# Patient Record
Sex: MALE | Race: WHITE
[De-identification: names, ages, dates, MRNs, and addresses within clinical notes are randomized per-mention and may not be internally consistent; named-entity substitution may affect disease eponyms.]

---

## 2018-06-01 ENCOUNTER — HOSPITAL ENCOUNTER (EMERGENCY)
Dept: HOSPITAL 50 - VM.ED | Age: 63
Discharge: HOME | End: 2018-06-01
Payer: COMMERCIAL

## 2018-06-01 DIAGNOSIS — Z79.899: ICD-10-CM

## 2018-06-01 DIAGNOSIS — Z88.8: ICD-10-CM

## 2018-06-01 DIAGNOSIS — I10: ICD-10-CM

## 2018-06-01 DIAGNOSIS — Z79.82: ICD-10-CM

## 2018-06-01 DIAGNOSIS — T15.91XA: Primary | ICD-10-CM

## 2018-06-01 DIAGNOSIS — E03.9: ICD-10-CM

## 2018-06-01 DIAGNOSIS — E78.00: ICD-10-CM

## 2018-06-04 NOTE — EDM.PDOC
ED HPI GENERAL MEDICAL PROBLEM





- General


Chief Complaint: ENT Problem


Stated Complaint: ?Foreign body in right eye


Time Seen by Provider: 06/01/18 20:00


Source of Information: Reports: Patient


History Limitations: Reports: No Limitations





- History of Present Illness


INITIAL COMMENTS - FREE TEXT/NARRATIVE: 





Pt. was handing cabinets in his kitchen and felt he got something in his eye 2 

days before coming to ER. He lives in [place], [place] and is here for a [employer] 

convention. He states that throughout the day today, he has noticed watering 

and discomfort to the R eye. Denies any significant occular trauma.


Location: Reports: Face


Treatments PTA: Reports: Other (see below)


Other Treatments PTA: flushe eye with NS eye drops


  ** Right eye


Pain Score (Numeric/FACES): 4





- Related Data


 Allergies











Allergy/AdvReac Type Severity Reaction Status Date / Time


 


niacin Allergy  Other Verified 06/01/18 19:50











Home Meds: 


 Home Meds





Aspirin 81 mg PO DAILY 06/01/18 [History]


Levothyroxine 200 mcg PO DAILY 06/01/18 [History]


Lisinopril 20 mg PO DAILY 06/01/18 [History]


Sertraline [Zoloft] 100 mg PO DAILY 06/01/18 [History]


Simvastatin [Zocor] 40 mg PO DAILY 06/01/18 [History]











Past Medical History


Cardiovascular History: Reports: High Cholesterol, Hypertension


Psychiatric History: Reports: Depression


Endocrine/Metabolic History: Reports: Hypothyroidism





ED ROS GENERAL





- Review of Systems


Review Of Systems: See Below


Constitutional: Reports: No Symptoms


HEENT: Reports: Eye Pain





ED EXAM, GENERAL





- Physical Exam


Exam: See Below


Exam Limited By: No Limitations


General Appearance: Alert, WD/WN, No Apparent Distress


Eye Exam: Right Eye: EOMI, Foreign Body, Normal Fundi, Normal Inspection, PERRL





Course





- Vital Signs


Last Recorded V/S: 





 Last Vital Signs











Temp  36.1 C   06/01/18 19:50


 


Pulse  71   06/01/18 19:50


 


Resp  16   06/01/18 19:50


 


BP  160/86 H  06/01/18 19:50


 


Pulse Ox  96   06/01/18 19:50














- Orders/Labs/Meds


Meds: 





Medications














Discontinued Medications














Generic Name Dose Route Start Last Admin





  Trade Name Freq  PRN Reason Stop Dose Admin


 


Diclofenac Sodium  1 packet  06/01/18 20:12  06/01/18 20:20





  Take Home: Diclofenac 0.1% Ophth, 1 Bottle  EYEBOTH  06/01/18 20:13  1 packet





  ONETIME ONE   Administration





     





     





     





     


 


Fluorescein Sodium  1 mg  06/01/18 19:51  06/01/18 20:06





  Ful-Jenny  EYERT  06/01/18 19:52  1 mg





  ONETIME ONE   Administration





     





     





     





     


 


Gentamicin Sulfate  1 packet  06/01/18 20:12  06/01/18 20:20





  Take Home: Gentamicin 0.3% Ophth Soln, 1 [place]  EYEBOTH  06/01/18 20:13  1 

packet





  ONETIME ONE   Administration





     





     





     





     


 


Proparacaine HCl  1 ml  06/01/18 19:50  06/01/18 20:04





  Proparacaine 0.5% Ophth Soln  EYELF  06/01/18 19:51  2 drop





  ONETIME ONE   Administration





     





     





     





     














Departure





- Departure


Time of Disposition: 20:28


Disposition: Home, Self-Care 01


Clinical Impression: 


 Foreign body, eye








- Discharge Information


Instructions:  Eye Foreign Body, Easy-to-Read


Referrals: 


PCP,Not In Area [Primary Care Provider] - 


Forms:  ED Department Discharge


Additional Instructions: 


Diclofenac drops 1 drop to R eye 3 times per day.





Gentamycin dropps 1 drop to R eye 5 times per day.





Follow-up at eye clinic of choice either tomorrow or Monday for a slit lamp 

examination.